# Patient Record
Sex: FEMALE | Race: WHITE | NOT HISPANIC OR LATINO | URBAN - METROPOLITAN AREA
[De-identification: names, ages, dates, MRNs, and addresses within clinical notes are randomized per-mention and may not be internally consistent; named-entity substitution may affect disease eponyms.]

---

## 2023-05-19 DIAGNOSIS — F41.8 ANXIOUS DEPRESSION: Primary | ICD-10-CM

## 2023-05-19 RX ORDER — ESCITALOPRAM OXALATE 20 MG/1
TABLET ORAL
Qty: 30 TABLET | Refills: 0 | Status: SHIPPED | OUTPATIENT
Start: 2023-05-19 | End: 2023-08-17

## 2023-08-17 DIAGNOSIS — F41.8 ANXIOUS DEPRESSION: ICD-10-CM

## 2023-08-17 RX ORDER — ESCITALOPRAM OXALATE 20 MG/1
TABLET ORAL
Qty: 30 TABLET | Refills: 0 | Status: SHIPPED | OUTPATIENT
Start: 2023-08-17 | End: 2023-09-28

## 2023-09-28 DIAGNOSIS — F41.8 ANXIOUS DEPRESSION: ICD-10-CM

## 2023-09-28 RX ORDER — ESCITALOPRAM OXALATE 20 MG/1
TABLET ORAL
Qty: 30 TABLET | Refills: 3 | Status: SHIPPED | OUTPATIENT
Start: 2023-09-28 | End: 2024-01-29

## 2023-10-02 PROBLEM — M25.831 MASS OF JOINT OF RIGHT WRIST: Status: ACTIVE | Noted: 2023-10-02

## 2023-10-02 PROBLEM — R53.83 MALAISE AND FATIGUE: Status: ACTIVE | Noted: 2023-10-02

## 2023-10-02 PROBLEM — R53.81 MALAISE AND FATIGUE: Status: ACTIVE | Noted: 2023-10-02

## 2023-10-02 PROBLEM — N97.9 FEMALE INFERTILITY: Status: ACTIVE | Noted: 2023-10-02

## 2023-10-02 PROBLEM — E55.9 VITAMIN D DEFICIENCY: Status: ACTIVE | Noted: 2023-10-02

## 2023-10-02 PROBLEM — N92.6 MENSTRUAL IRREGULARITY: Status: ACTIVE | Noted: 2023-10-02

## 2023-10-02 PROBLEM — F41.9 ANXIETY AND DEPRESSION: Status: ACTIVE | Noted: 2023-10-02

## 2023-10-02 PROBLEM — R79.89 ELEVATED PROLACTIN LEVEL: Status: ACTIVE | Noted: 2023-10-02

## 2023-10-02 PROBLEM — G56.03 BILATERAL CARPAL TUNNEL SYNDROME: Status: ACTIVE | Noted: 2023-10-02

## 2023-10-02 PROBLEM — E22.1 HYPERPROLACTINEMIA (MULTI): Status: ACTIVE | Noted: 2023-10-02

## 2023-10-02 PROBLEM — E78.00 ELEVATED LDL CHOLESTEROL LEVEL: Status: ACTIVE | Noted: 2023-10-02

## 2023-10-02 PROBLEM — E66.3 OVERWEIGHT (BMI 25.0-29.9): Status: ACTIVE | Noted: 2023-10-02

## 2023-10-02 PROBLEM — E23.7 PITUITARY LESION (MULTI): Status: ACTIVE | Noted: 2023-10-02

## 2023-10-02 PROBLEM — H65.03 BILATERAL ACUTE SEROUS OTITIS MEDIA: Status: ACTIVE | Noted: 2023-10-02

## 2023-10-02 PROBLEM — F32.A ANXIETY AND DEPRESSION: Status: ACTIVE | Noted: 2023-10-02

## 2023-10-02 PROBLEM — R82.998 LEUKOCYTES IN URINE: Status: ACTIVE | Noted: 2023-10-02

## 2023-10-02 RX ORDER — ALBUTEROL SULFATE 0.83 MG/ML
SOLUTION RESPIRATORY (INHALATION)
COMMUNITY
Start: 2019-04-10

## 2023-10-02 RX ORDER — CYCLOBENZAPRINE HCL 10 MG
TABLET ORAL
COMMUNITY
Start: 2022-11-01

## 2023-10-02 RX ORDER — ALBUTEROL SULFATE 90 UG/1
AEROSOL, METERED RESPIRATORY (INHALATION)
COMMUNITY

## 2023-10-04 ENCOUNTER — ANCILLARY PROCEDURE (OUTPATIENT)
Dept: RADIOLOGY | Facility: CLINIC | Age: 38
End: 2023-10-04
Payer: COMMERCIAL

## 2023-10-04 ENCOUNTER — OFFICE VISIT (OUTPATIENT)
Dept: ORTHOPEDIC SURGERY | Facility: CLINIC | Age: 38
End: 2023-10-04
Payer: COMMERCIAL

## 2023-10-04 DIAGNOSIS — M25.512 BILATERAL SHOULDER PAIN, UNSPECIFIED CHRONICITY: ICD-10-CM

## 2023-10-04 DIAGNOSIS — M25.511 BILATERAL SHOULDER PAIN, UNSPECIFIED CHRONICITY: ICD-10-CM

## 2023-10-04 DIAGNOSIS — M25.562 PAIN IN BOTH KNEES, UNSPECIFIED CHRONICITY: ICD-10-CM

## 2023-10-04 DIAGNOSIS — M25.561 PAIN IN BOTH KNEES, UNSPECIFIED CHRONICITY: ICD-10-CM

## 2023-10-04 DIAGNOSIS — M25.562 ACUTE PAIN OF BOTH KNEES: Primary | ICD-10-CM

## 2023-10-04 DIAGNOSIS — M25.511 ACUTE PAIN OF BOTH SHOULDERS: ICD-10-CM

## 2023-10-04 DIAGNOSIS — M25.561 ACUTE PAIN OF BOTH KNEES: Primary | ICD-10-CM

## 2023-10-04 DIAGNOSIS — M25.512 ACUTE PAIN OF BOTH SHOULDERS: ICD-10-CM

## 2023-10-04 PROCEDURE — 99203 OFFICE O/P NEW LOW 30 MIN: CPT | Performed by: ORTHOPAEDIC SURGERY

## 2023-10-04 PROCEDURE — 73564 X-RAY EXAM KNEE 4 OR MORE: CPT | Mod: 50

## 2023-10-04 PROCEDURE — 73564 X-RAY EXAM KNEE 4 OR MORE: CPT | Mod: BILATERAL PROCEDURE | Performed by: RADIOLOGY

## 2023-10-04 PROCEDURE — 73030 X-RAY EXAM OF SHOULDER: CPT | Mod: 50

## 2023-10-04 PROCEDURE — 73030 X-RAY EXAM OF SHOULDER: CPT | Mod: BILATERAL PROCEDURE | Performed by: RADIOLOGY

## 2023-10-04 PROCEDURE — 99213 OFFICE O/P EST LOW 20 MIN: CPT | Performed by: ORTHOPAEDIC SURGERY

## 2023-10-04 PROCEDURE — 1036F TOBACCO NON-USER: CPT | Performed by: ORTHOPAEDIC SURGERY

## 2023-10-04 RX ORDER — MELOXICAM 15 MG/1
15 TABLET ORAL DAILY
Qty: 30 TABLET | Refills: 0 | Status: SHIPPED | OUTPATIENT
Start: 2023-10-04 | End: 2023-10-30

## 2023-10-04 NOTE — PROGRESS NOTES
History of Present Illness:   The patient presents today endorsing bilateral knee pain. The pain localizes over the medial joint line.  The patient denies any recent trauma and notes the insidious onset of pain.  The pain is achy, constant, worse with activity and better with rest. The patient notes occasional locking, catching and giving out.  The patient has tried the following modalities: Rest, ice, anti-inflammatories, some physical therapy and home exercises.  Pain is worse on the right than the left.    Patient is also presented today for initial evaluation of bilateral shoulder pain that she has had for many years.  No obvious recent injury but did have an MRI in 2022 that had revealed some partial thickness tearing of the distal supraspinatus tendon.  She has been very active recently and has aggravated both shoulders.  She does feel an anterior pain in both shoulders that radiates laterally, no numbness, tingling, loss sensation in the bilateral upper extremity.  Pain is worse on the left than the right.    Currently run several businesses, is working as a , doing some remodeling, enjoys weightlifting and running, competes in triathlons.    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/2016    Pituitary adenoma    Personal history of other mental and behavioral disorders     History of depression    Unspecified asthma, uncomplicated 02/22/2019    Asthmatic bronchitis     Past Surgical History:   Procedure Laterality Date    OTHER SURGICAL HISTORY  10/20/2016    Ear Surgery    SINUS SURGERY  10/20/2016    Sinus Surgery       Current Outpatient Medications:     albuterol (ProAir HFA) 90 mcg/actuation inhaler, INHALE 1 TO 2 PUFFS EVERY 4 TO 6 HOURS AS NEEDED., Disp: , Rfl:     albuterol 2.5 mg /3 mL (0.083 %) nebulizer solution, USE 1 UNIT DOSE IN NEBULIZER 4 TIMES DAILY, Disp: , Rfl:     cyclobenzaprine (Flexeril) 10 mg tablet, TAKE ONE TABLET BY MOUTH  EVERY 8 HOURS AS NEEDED, Disp: , Rfl:     escitalopram (Lexapro) 20 mg tablet, TAKE ONE TABLET BY MOUTH DAILY, Disp: 30 tablet, Rfl: 3    Review of Systems   GENERAL: Negative for malaise, significant weight loss, fever  MUSCULOSKELETAL: See HPI  NEURO:  Negative for numbness / tingling     Physical Examination:  Bilateral Knee:  Skin healthy and intact  No gross swelling or ecchymosis  No significant varus or valgus malalignment  Effusion: absent    ROM:  Full flexion   Full extension  No pain with internal rotation of the hip  Tenderness to palpation:  medial joint line   No laxity to valgus stress  No laxity to varus stress  Negative Lachman´s test  Negative anterior drawer test  Negative posterior drawer test  Positive Amara´s test  Neurovascular exam normal distally    Bilateral Shoulder:  Skin healthy to gross inspection  No ecchymosis, no edema, no gross atrophy  No tenderness to palpation over acromioclavicular joint  No tenderness to palpation over biceps tendon  No tenderness to palpation over the cervical spine     ROM:  Full forward flexion  Full external rotation  IR symmetric to contralateral side  Strength:  5/5 Resisted elevation  5/5 Resisted external rotation    Negative lift off test   Negative Spurling´s test  Negative Neer and Hawking´s test  Mild pain with [place]'s test.  Negative Speed's test  Neurovascular exam normal distally      Imaging:  Plain films of bilateral knee reveal no acute fracture or dislocation, good alignment and position, and minimal signs of degenerative changes diffusely.  Plain films of the bilateral shoulder reveal no fracture or dislocation, good alignment and position, no signs of degenerative changes, no sign of acromial downsloping.  Appropriate humeral head height.    Previous MRI of the left shoulder reveals evidence of distal supraspinatus partial-thickness tearing without full-thickness component.  The superior labrum is difficult to assess due to image  quality.    Assessment:    Patient with bilateral knee pain concern for meniscal tear on the right, bilateral knee likely also representing patellofemoral syndrome.  Patient with bilateral shoulder pain, left worse than right, concern for likely SLAP tear in the setting of partial-thickness distal supraspinatus tearing on the left.  For the right shoulder, concern for SLAP tear.    Plan:  We discussed MRI of treatment option for the patient occluding rest, ice, anti-inflammatories as well as corticosteroid injections, physical therapy, bracing and home exercises.  Regarding bilateral knee pain, right worse than left, we do have some concern for medial meniscal pathology would recommend MRI for evaluation.  Recommend meloxicam prescription.  Regarding pain to the shoulders, left worse than right, reviewed that she may be trending towards small arthroscopic surgery for the left shoulder to assess and treat possible rotator cuff tear and SLAP pathology.  We also recommended corticosteroid injections to the left shoulder today, however patient declined.    Charlie Hendricks PA-C     In a face to face encounter, I evaluated the patient and performed a physical examination, discussed pertinent diagnostic studies if indicated and discussed diagnosis and management strategies with both the patient and physician assistant / nurse practitioner.  I reviewed the PA/NP's note and agree with the documented findings and plan of care.    Patient with bilateral shoulder pain consistent with superior labral pathology, bilateral medial knee pain concerning for medial meniscal pathology.  She is a triathlete, very active discussed formal therapy and anti-inflammatories, she feels improved recommend MRIs.    Walter Sorensen MD

## 2023-10-04 NOTE — LETTER
October 4, 2023     Chicho Padgett DO  [address] [facility] [address]    Patient: Vinita Mitchell   YOB: 1985   Date of Visit: 10/4/2023       Dear Dr. Chicho Padgett DO:    Thank you for referring Vinita Mitchell to me for evaluation. Below are my notes for this consultation.  If you have questions, please do not hesitate to call me. I look forward to following your patient along with you.       Sincerely,     Walter Sorensen MD      CC: No Recipients  ______________________________________________________________________________________      History of Present Illness:   The patient presents today endorsing bilateral knee pain. The pain localizes over the medial joint line.  The patient denies any recent trauma and notes the insidious onset of pain.  The pain is achy, constant, worse with activity and better with rest. The patient notes occasional locking, catching and giving out.  The patient has tried the following modalities: Rest, ice, anti-inflammatories, some physical therapy and home exercises.  Pain is worse on the right than the left.    Patient is also presented today for initial evaluation of bilateral shoulder pain that she has had for many years.  No obvious recent injury but did have an MRI in 2022 that had revealed some partial thickness tearing of the distal supraspinatus tendon.  She has been very active recently and has aggravated both shoulders.  She does feel an anterior pain in both shoulders that radiates laterally, no numbness, tingling, loss sensation in the bilateral upper extremity.  Pain is worse on the left than the right.    Currently run several businesses, is working as a , doing some remodeling, enjoys weightlifting and running, competes in triathlons.    Past Medical History:   Diagnosis Date   • Benign neoplasm of pituitary gland (CMS/[facility]) 09/21/2016    Pituitary adenoma   • Personal history of other mental and behavioral disorders      History of depression   • Unspecified asthma, uncomplicated 02/22/2019    Asthmatic bronchitis     Past Surgical History:   Procedure Laterality Date   • OTHER SURGICAL HISTORY  10/20/2016    Ear Surgery   • SINUS SURGERY  10/20/2016    Sinus Surgery       Current Outpatient Medications:   •  albuterol (ProAir HFA) 90 mcg/actuation inhaler, INHALE 1 TO 2 PUFFS EVERY 4 TO 6 HOURS AS NEEDED., Disp: , Rfl:   •  albuterol 2.5 mg /3 mL (0.083 %) nebulizer solution, USE 1 UNIT DOSE IN NEBULIZER 4 TIMES DAILY, Disp: , Rfl:   •  cyclobenzaprine (Flexeril) 10 mg tablet, TAKE ONE TABLET BY MOUTH EVERY 8 HOURS AS NEEDED, Disp: , Rfl:   •  escitalopram (Lexapro) 20 mg tablet, TAKE ONE TABLET BY MOUTH DAILY, Disp: 30 tablet, Rfl: 3    Review of Systems   GENERAL: Negative for malaise, significant weight loss, fever  MUSCULOSKELETAL: See HPI  NEURO:  Negative for numbness / tingling     Physical Examination:  Bilateral Knee:  Skin healthy and intact  No gross swelling or ecchymosis  No significant varus or valgus malalignment  Effusion: absent    ROM:  Full flexion   Full extension  No pain with internal rotation of the hip  Tenderness to palpation:  medial joint line   No laxity to valgus stress  No laxity to varus stress  Negative Lachman´s test  Negative anterior drawer test  Negative posterior drawer test  Positive Amara´s test  Neurovascular exam normal distally    Bilateral Shoulder:  Skin healthy to gross inspection  No ecchymosis, no edema, no gross atrophy  No tenderness to palpation over acromioclavicular joint  No tenderness to palpation over biceps tendon  No tenderness to palpation over the cervical spine     ROM:  Full forward flexion  Full external rotation  IR symmetric to contralateral side  Strength:  5/5 Resisted elevation  5/5 Resisted external rotation    Negative lift off test   Negative Spurling´s test  Negative Neer and Hawking´s test  Mild pain with [place]'s test.  Negative Speed's test  Neurovascular  exam normal distally      Imaging:  Plain films of bilateral knee reveal no acute fracture or dislocation, good alignment and position, and minimal signs of degenerative changes diffusely.  Plain films of the bilateral shoulder reveal no fracture or dislocation, good alignment and position, no signs of degenerative changes, no sign of acromial downsloping.  Appropriate humeral head height.    Previous MRI of the left shoulder reveals evidence of distal supraspinatus partial-thickness tearing without full-thickness component.  The superior labrum is difficult to assess due to image quality.    Assessment:    Patient with bilateral knee pain concern for meniscal tear on the right, bilateral knee likely also representing patellofemoral syndrome.  Patient with bilateral shoulder pain, left worse than right, concern for likely SLAP tear in the setting of partial-thickness distal supraspinatus tearing on the left.  For the right shoulder, concern for SLAP tear.    Plan:  We discussed MRI of treatment option for the patient occluding rest, ice, anti-inflammatories as well as corticosteroid injections, physical therapy, bracing and home exercises.  Regarding bilateral knee pain, right worse than left, we do have some concern for medial meniscal pathology would recommend MRI for evaluation.  Recommend meloxicam prescription.  Regarding pain to the shoulders, left worse than right, reviewed that she may be trending towards small arthroscopic surgery for the left shoulder to assess and treat possible rotator cuff tear and SLAP pathology.  We also recommended corticosteroid injections to the left shoulder today, however patient declined.    Charlie Hendricks PA-C     In a face to face encounter, I evaluated the patient and performed a physical examination, discussed pertinent diagnostic studies if indicated and discussed diagnosis and management strategies with both the patient and physician assistant / nurse practitioner.  I  reviewed the PA/NP's note and agree with the documented findings and plan of care.    Patient with bilateral shoulder pain consistent with superior labral pathology, bilateral medial knee pain concerning for medial meniscal pathology.  She is a triathlete, very active discussed formal therapy and anti-inflammatories, she feels improved recommend MRIs.    Walter Sorensen MD

## 2023-10-30 ENCOUNTER — OFFICE VISIT (OUTPATIENT)
Dept: ORTHOPEDIC SURGERY | Facility: CLINIC | Age: 38
End: 2023-10-30
Payer: COMMERCIAL

## 2023-10-30 DIAGNOSIS — G56.03 BILATERAL CARPAL TUNNEL SYNDROME: Primary | ICD-10-CM

## 2023-10-30 PROCEDURE — 1036F TOBACCO NON-USER: CPT | Performed by: ORTHOPAEDIC SURGERY

## 2023-10-30 PROCEDURE — 99214 OFFICE O/P EST MOD 30 MIN: CPT | Performed by: ORTHOPAEDIC SURGERY

## 2023-10-30 NOTE — PROGRESS NOTES
History present illness: Patient presents today for evaluation of the bilateral upper extremities.  She describes little in the way of relief from steroid injection to the right volar wrist mass and to bilateral carpal tunnels.  The wrist mass however is not discretely painful, or at least rarely so.      Past medical history: The patient's past medical history, family history, social history, and review of systems were documented on the patient medical intake.  The updated data was reviewed in the electronic medical record.  History is negative except otherwise stated in history of present illness.        Physical examination:  General: Alert and oriented to person, place, and time.  No acute distress and breathing comfortably: Pleasant and cooperative with examination.  HEENT: Head is normocephalic and atraumatic.  Neck: Supple, no visible swelling.  Cardiovascular: No palpable tachycardia  Lungs: No audible wheezing or labored breathing  Abdomen: Nondistended.  Extremities: Evaluation of the bilateral upper extremities finds the patient had palpable radial artery at the wrists with brisk capillary refill to all digits.  Patient has intact sensation to axillary radial median and ulnar nerves.  There are no open wounds.  There are no signs of infection.  There is no evidence of lymphedema or lymphatic streaking.  The patient has supple compartments to bilateral arm forearm and hand.  Volar radial distal third forearm mass consistent with ganglion.  Positive Tinel's over course of median nerve to bilateral wrist.      Radiology:      Assessment: Bilateral carpal tunnel syndrome.  Right wrist volar ganglion.      Plan: Treatment options were discussed.  Recommendations are made for EMG nerve conduction study test to evaluate for carpal tunnel syndrome bilaterally.  I will see back when the study is completed to discuss findings and treatment options from there.  No x-rays necessary upon  return            Procedure:

## 2024-01-05 DIAGNOSIS — M25.512 BILATERAL SHOULDER PAIN, UNSPECIFIED CHRONICITY: ICD-10-CM

## 2024-01-05 DIAGNOSIS — M25.562 PAIN IN BOTH KNEES, UNSPECIFIED CHRONICITY: ICD-10-CM

## 2024-01-05 DIAGNOSIS — M25.511 BILATERAL SHOULDER PAIN, UNSPECIFIED CHRONICITY: ICD-10-CM

## 2024-01-05 DIAGNOSIS — M25.561 PAIN IN BOTH KNEES, UNSPECIFIED CHRONICITY: ICD-10-CM

## 2024-01-22 ENCOUNTER — HOSPITAL ENCOUNTER (OUTPATIENT)
Dept: NEUROLOGY | Facility: HOSPITAL | Age: 39
Discharge: HOME | End: 2024-01-22
Payer: COMMERCIAL

## 2024-01-22 DIAGNOSIS — G56.03 BILATERAL CARPAL TUNNEL SYNDROME: ICD-10-CM

## 2024-01-22 PROCEDURE — 95913 NRV CNDJ TEST 13/> STUDIES: CPT | Performed by: PSYCHIATRY & NEUROLOGY

## 2024-01-22 PROCEDURE — 95885 MUSC TST DONE W/NERV TST LIM: CPT | Mod: GC,LT | Performed by: PSYCHIATRY & NEUROLOGY

## 2024-01-22 PROCEDURE — 95885 MUSC TST DONE W/NERV TST LIM: CPT | Performed by: PSYCHIATRY & NEUROLOGY

## 2024-01-22 PROCEDURE — 95886 MUSC TEST DONE W/N TEST COMP: CPT | Performed by: PSYCHIATRY & NEUROLOGY

## 2024-01-22 PROCEDURE — 95886 MUSC TEST DONE W/N TEST COMP: CPT | Mod: GC,RT | Performed by: PSYCHIATRY & NEUROLOGY

## 2024-01-29 DIAGNOSIS — F41.8 ANXIOUS DEPRESSION: ICD-10-CM

## 2024-01-29 RX ORDER — ESCITALOPRAM OXALATE 20 MG/1
TABLET ORAL
Qty: 30 TABLET | Refills: 3 | Status: SHIPPED | OUTPATIENT
Start: 2024-01-29 | End: 2024-05-31

## 2024-02-08 ENCOUNTER — OFFICE VISIT (OUTPATIENT)
Dept: ORTHOPEDIC SURGERY | Facility: CLINIC | Age: 39
End: 2024-02-08
Payer: COMMERCIAL

## 2024-02-08 DIAGNOSIS — G56.03 BILATERAL CARPAL TUNNEL SYNDROME: ICD-10-CM

## 2024-02-08 PROCEDURE — 1036F TOBACCO NON-USER: CPT | Performed by: ORTHOPAEDIC SURGERY

## 2024-02-08 PROCEDURE — 99214 OFFICE O/P EST MOD 30 MIN: CPT | Performed by: ORTHOPAEDIC SURGERY

## 2024-02-08 NOTE — PROGRESS NOTES
History present illness: Patient presents today for evaluation of symptoms compatible with bilateral carpal tunnel syndrome.  She presents for EMG nerve conduction study test.  She did not get much in the way of relief from previous carpal tunnel injection bilaterally.  Her EMG shows evidence for normal study.  Her symptoms are worsening.  She describes classic symptoms of numbness and tingling through median nerve distribution to bilateral hands.      Past medical history: The patient's past medical history, family history, social history, and review of systems were documented on the patient medical intake.  The updated data was reviewed in the electronic medical record.  History is negative except otherwise stated in history of present illness.        Physical examination:  General: Alert and oriented to person, place, and time.  No acute distress and breathing comfortably: Pleasant and cooperative with examination.  HEENT: Head is normocephalic and atraumatic.  Neck: Supple, no visible swelling.  Cardiovascular: No palpable tachycardia  Lungs: No audible wheezing or labored breathing  Abdomen: Nondistended.  Extremities: Evaluation of the bilateral upper extremities finds the patient had palpable radial artery at the wrists with brisk capillary refill to all digits.  Patient has intact sensation to axillary radial median and ulnar nerves.  There are no open wounds.  There are no signs of infection.  There is no evidence of lymphedema or lymphatic streaking.  The patient has supple compartments to bilateral arm forearm and hand.  Positive Tinel's over course of median nerve to bilateral wrist.  Median nerve is highly symptomatic on today's exam.      Radiology:      Assessment: Bilateral carpal tunnel syndrome      Plan: Subjective symptoms and physical examination are classic for carpal tunnel syndrome.  Her response to diagnostic steroid injection and the findings on EMG are not classic for carpal tunnel  syndrome.  We talked about a 6-week course of therapy for nerve gliding exercises for carpal tunnel syndrome and follow-up thereafter.  Despite nonoperative measures symptoms are worsening.  If she does not improve with nerve gliding and 6 additional weeks of observation we will likely recommend for right carpal tunnel release or left carpal tunnel release, the pending which side is worse.        Procedure:

## 2024-03-26 ENCOUNTER — APPOINTMENT (OUTPATIENT)
Dept: ORTHOPEDIC SURGERY | Facility: CLINIC | Age: 39
End: 2024-03-26
Payer: COMMERCIAL

## 2024-04-02 ENCOUNTER — OFFICE VISIT (OUTPATIENT)
Dept: ORTHOPEDIC SURGERY | Facility: CLINIC | Age: 39
End: 2024-04-02
Payer: COMMERCIAL

## 2024-04-02 DIAGNOSIS — M25.511 ACUTE PAIN OF BOTH SHOULDERS: ICD-10-CM

## 2024-04-02 DIAGNOSIS — M25.512 ACUTE PAIN OF BOTH SHOULDERS: ICD-10-CM

## 2024-04-02 DIAGNOSIS — S43.431A SUPERIOR LABRUM ANTERIOR-TO-POSTERIOR (SLAP) TEAR OF RIGHT SHOULDER: ICD-10-CM

## 2024-04-02 DIAGNOSIS — S76.019S: ICD-10-CM

## 2024-04-02 PROCEDURE — 99213 OFFICE O/P EST LOW 20 MIN: CPT | Performed by: ORTHOPAEDIC SURGERY

## 2024-04-02 PROCEDURE — 1036F TOBACCO NON-USER: CPT | Performed by: ORTHOPAEDIC SURGERY

## 2024-04-02 PROCEDURE — 99214 OFFICE O/P EST MOD 30 MIN: CPT | Performed by: ORTHOPAEDIC SURGERY

## 2024-04-02 NOTE — PROGRESS NOTES
History of Present Illness:   Patient returns today for bilateral shoulder pain, right worse than left as well as bilateral hip pain, right worse than left.  Regarding the shoulder she was evaluated previously for possible SLAP pathology, her pain is not improved and she still has the symptoms.  She notes that exercising makes her symptoms substantially worse and she has some occasional clicking and popping on the anterior aspect of the shoulder.  Denies any obvious weakness.  There is no numbness, tingling or loss of sensation.    Regarding bilateral hip pain, patient endorses history of multiple years of hip pain with burning stinging sensations across the lateral aspect of the hips just along the greater trochanter and abductor groups.  There is occasional groin pain as well that accompanies a pinching sensation in the groin.  She notes that she sits for long periods of time her hip pain becomes more substantial when she is more difficulty getting up out of a chair.  There is no obvious buttock pain or posterior thigh pain.    Review of Systems   GENERAL: Negative for malaise, significant weight loss, fever  MUSCULOSKELETAL: see HPI  NEURO:  Negative    Physical Examination:  Right Shoulder:    Skin healthy to gross inspection  No ecchymosis, no swelling, no gross atrophy  No tenderness to palpation over acromioclavicular joint  No tenderness to palpation over biceps tendon  No tenderness to palpation over the cervical spine     ROM:  Full forward flexion  Full external rotation  IR to thoracic spine, symmetric to contralateral side  Strength:  5-/5 Resisted elevation  5/5 Resisted external rotation  Negative lift off test   Negative Spurling´s test  Positive Neer and Hawking´s test  Positive Speeds's, [place]'s active compression test  Neurovascular exam normal distally     Bilateral Hip:  Normal gait  Negative Trendelenburg sign  Skin healthy and intact  No tenderness to palpation over lumbar spine  Mild  tenderness over greater trochanter    Full forward flexion  Symmetric motion, no loss of internal rotation   No weakness with resisted hip flexion, abduction or adduction    Mild positive negative flexion/adduction/internal rotation  Negative flexion/abduction/external rotation test  Negative straight leg raise  Neurovascular exam normal distally     Imaging:  Deferred    Assessment:   Patient with right shoulder pain concern for SLAP pathology.  Patient with bilateral hip pain concern for abductor tendon injury/chronic tearing interstitially versus lumbar radicular origin.  Concern for also for labral pathology bilaterally to the hips.    Plan:  We discussed a variety of treatment options for the patient occluding rest, ice, anti-inflammatories as well as MR arthrogram of the right shoulder as we do have strong suspicion for SLAP pathology.  Patient agreeable to this and would like to proceed.  Regarding treatment for bilateral hips would recommend a course of dedicated physical therapy for abductor tendons to formally evaluate and assess the degree of strength as well as any therapeutic regimens.  We have discussed possibility of dry needling for the hips if it is concern for musculoskeletal injury as this could promote healing.  Ultimately discussed that we would defer to hip specialist down the road if she fails to improve with therapy and anti-inflammatories.    Charlie Hendricks PA-C     In a face to face encounter, I evaluated the patient and performed a physical examination, discussed pertinent diagnostic studies if indicated and discussed diagnosis and management strategies with both the patient and physician assistant / nurse practitioner.  I reviewed the PA/NP's note and agree with the documented findings and plan of care.    Patient with bilateral shoulder pain right greater than left concern for impingement versus superior labral pathology.  Recommend MRI to evaluate.  She does have pain in multiple  joints discussed the possibility of a chronic diffuse tendinopathy.    Walter Sorensen MD

## 2024-04-15 ENCOUNTER — TELEPHONE (OUTPATIENT)
Dept: PRIMARY CARE | Facility: CLINIC | Age: 39
End: 2024-04-15
Payer: COMMERCIAL

## 2024-04-15 NOTE — TELEPHONE ENCOUNTER
Patient lvm today that she would like an order for mammogram and ultrasound for lump on right breast.   Can you place this for her?

## 2024-04-17 DIAGNOSIS — Z12.31 ENCOUNTER FOR SCREENING MAMMOGRAM FOR MALIGNANT NEOPLASM OF BREAST: Primary | ICD-10-CM

## 2024-04-29 DIAGNOSIS — Z12.31 SCREENING MAMMOGRAM, ENCOUNTER FOR: ICD-10-CM

## 2024-04-29 DIAGNOSIS — N63.0 BREAST LUMP IN FEMALE: ICD-10-CM

## 2024-04-30 ENCOUNTER — HOSPITAL ENCOUNTER (OUTPATIENT)
Dept: RADIOLOGY | Facility: HOSPITAL | Age: 39
Discharge: HOME | End: 2024-04-30
Payer: COMMERCIAL

## 2024-04-30 DIAGNOSIS — M25.512 ACUTE PAIN OF BOTH SHOULDERS: ICD-10-CM

## 2024-04-30 DIAGNOSIS — S43.431A SUPERIOR LABRUM ANTERIOR-TO-POSTERIOR (SLAP) TEAR OF RIGHT SHOULDER: ICD-10-CM

## 2024-04-30 DIAGNOSIS — M25.511 ACUTE PAIN OF BOTH SHOULDERS: ICD-10-CM

## 2024-04-30 PROCEDURE — 77002 NEEDLE LOCALIZATION BY XRAY: CPT | Mod: RT

## 2024-04-30 PROCEDURE — 73222 MRI JOINT UPR EXTREM W/DYE: CPT | Mod: RT

## 2024-04-30 PROCEDURE — 2550000001 HC RX 255 CONTRASTS: Performed by: ORTHOPAEDIC SURGERY

## 2024-04-30 PROCEDURE — A9575 INJ GADOTERATE MEGLUMI 0.1ML: HCPCS | Performed by: ORTHOPAEDIC SURGERY

## 2024-04-30 PROCEDURE — 73222 MRI JOINT UPR EXTREM W/DYE: CPT | Mod: RIGHT SIDE | Performed by: RADIOLOGY

## 2024-04-30 PROCEDURE — 96373 THER/PROPH/DIAG INJ IA: CPT | Performed by: ORTHOPAEDIC SURGERY

## 2024-04-30 RX ORDER — LIDOCAINE HYDROCHLORIDE 10 MG/ML
10 INJECTION, SOLUTION EPIDURAL; INFILTRATION; INTRACAUDAL; PERINEURAL ONCE
Status: DISCONTINUED | OUTPATIENT
Start: 2024-04-30 | End: 2024-05-01 | Stop reason: HOSPADM

## 2024-04-30 RX ORDER — GADOTERATE MEGLUMINE 376.9 MG/ML
1 INJECTION INTRAVENOUS
Status: COMPLETED | OUTPATIENT
Start: 2024-04-30 | End: 2024-04-30

## 2024-04-30 RX ORDER — LIDOCAINE HYDROCHLORIDE 10 MG/ML
8 INJECTION, SOLUTION EPIDURAL; INFILTRATION; INTRACAUDAL; PERINEURAL ONCE
Status: DISCONTINUED | OUTPATIENT
Start: 2024-04-30 | End: 2024-05-01 | Stop reason: HOSPADM

## 2024-04-30 RX ADMIN — GADOTERATE MEGLUMINE 1 ML: 376.9 INJECTION INTRAVENOUS at 10:24

## 2024-04-30 RX ADMIN — IOHEXOL 8 ML: 180 INJECTION INTRAVENOUS at 11:06

## 2024-05-07 ENCOUNTER — OFFICE VISIT (OUTPATIENT)
Dept: ORTHOPEDIC SURGERY | Facility: CLINIC | Age: 39
End: 2024-05-07
Payer: COMMERCIAL

## 2024-05-07 DIAGNOSIS — M25.512 BILATERAL SHOULDER PAIN, UNSPECIFIED CHRONICITY: ICD-10-CM

## 2024-05-07 DIAGNOSIS — S43.431A SUPERIOR LABRUM ANTERIOR-TO-POSTERIOR (SLAP) TEAR OF RIGHT SHOULDER: ICD-10-CM

## 2024-05-07 DIAGNOSIS — M25.511 BILATERAL SHOULDER PAIN, UNSPECIFIED CHRONICITY: ICD-10-CM

## 2024-05-07 PROCEDURE — 1036F TOBACCO NON-USER: CPT | Performed by: ORTHOPAEDIC SURGERY

## 2024-05-07 PROCEDURE — 99213 OFFICE O/P EST LOW 20 MIN: CPT | Performed by: ORTHOPAEDIC SURGERY

## 2024-05-07 PROCEDURE — 99214 OFFICE O/P EST MOD 30 MIN: CPT | Performed by: ORTHOPAEDIC SURGERY

## 2024-05-07 NOTE — PROGRESS NOTES
History of Present Illness:   Patient returns today with  right greater than left  shoulder pain.  The patient is here to review MRI based on failure to improve with non-surgical modalities.    The pain is sharp in nature, localizes lateral and deep.  Better with rest.    Review of Systems   GENERAL: Negative for malaise, significant weight loss, fever  MUSCULOSKELETAL: see HPI  NEURO:  Negative    Physical Examination:  Right Shoulder:    Skin healthy to gross inspection  No ecchymosis, no swelling, no gross atrophy  No tenderness to palpation over acromioclavicular joint  No tenderness to palpation over biceps tendon  No tenderness to palpation over the cervical spine     ROM:  Full forward flexion  Full external rotation  IR to thoracic spine, symmetric to contralateral side  Strength:  5-/5 Resisted elevation  5/5 Resisted external rotation  Negative lift off test   Negative Spurling´s test  Positive Neer and Hawking´s test  Neurovascular exam normal distally      Imaging:  MRI demonstrates: Anterior superior labral tear right shoulder    Assessment:  Patient with right shoulder SLAP tear with some anterior extension    Plan:  MRI was reviewed.   We reviewed with the patient a variety of treatment options.  She states the Mobic made her feel slightly weird and we had to discontinue that.    Discussed formal physical therapy she is working with a  in order was provided.    Discussed arthroscopy labral repair possible biceps tenodesis.  She like to delay that if possible due to the weather, she has similar symptoms on the contralateral side could consider imaging but feel she likely has a similar issue.

## 2024-05-08 ENCOUNTER — APPOINTMENT (OUTPATIENT)
Dept: ORTHOPEDIC SURGERY | Facility: CLINIC | Age: 39
End: 2024-05-08
Payer: COMMERCIAL

## 2024-05-08 ENCOUNTER — APPOINTMENT (OUTPATIENT)
Dept: RADIOLOGY | Facility: HOSPITAL | Age: 39
End: 2024-05-08
Payer: COMMERCIAL

## 2024-05-13 ENCOUNTER — HOSPITAL ENCOUNTER (OUTPATIENT)
Dept: RADIOLOGY | Facility: CLINIC | Age: 39
Discharge: HOME | End: 2024-05-13
Payer: COMMERCIAL

## 2024-05-13 VITALS — BODY MASS INDEX: 25.2 KG/M2 | HEIGHT: 59 IN | WEIGHT: 125 LBS

## 2024-05-13 DIAGNOSIS — Z12.31 ENCOUNTER FOR SCREENING MAMMOGRAM FOR MALIGNANT NEOPLASM OF BREAST: ICD-10-CM

## 2024-05-13 DIAGNOSIS — N63.0 BREAST LUMP IN FEMALE: ICD-10-CM

## 2024-05-13 PROCEDURE — 76983 USE EA ADDL TARGET LESION: CPT

## 2024-05-13 PROCEDURE — 77062 BREAST TOMOSYNTHESIS BI: CPT

## 2024-05-13 PROCEDURE — 76642 ULTRASOUND BREAST LIMITED: CPT | Mod: 50,59

## 2024-05-13 PROCEDURE — 77066 DX MAMMO INCL CAD BI: CPT | Performed by: STUDENT IN AN ORGANIZED HEALTH CARE EDUCATION/TRAINING PROGRAM

## 2024-05-13 PROCEDURE — 76642 ULTRASOUND BREAST LIMITED: CPT | Performed by: STUDENT IN AN ORGANIZED HEALTH CARE EDUCATION/TRAINING PROGRAM

## 2024-05-13 PROCEDURE — 76982 USE 1ST TARGET LESION: CPT | Mod: 50

## 2024-05-13 PROCEDURE — 77062 BREAST TOMOSYNTHESIS BI: CPT | Performed by: STUDENT IN AN ORGANIZED HEALTH CARE EDUCATION/TRAINING PROGRAM

## 2024-05-31 DIAGNOSIS — F41.8 ANXIOUS DEPRESSION: ICD-10-CM

## 2024-05-31 RX ORDER — ESCITALOPRAM OXALATE 20 MG/1
TABLET ORAL
Qty: 30 TABLET | Refills: 2 | Status: SHIPPED | OUTPATIENT
Start: 2024-05-31

## 2024-11-08 DIAGNOSIS — F41.8 ANXIOUS DEPRESSION: ICD-10-CM

## 2024-11-08 RX ORDER — ESCITALOPRAM OXALATE 20 MG/1
20 TABLET ORAL DAILY
Qty: 30 TABLET | Refills: 3 | Status: SHIPPED | OUTPATIENT
Start: 2024-11-08

## 2024-12-13 ENCOUNTER — APPOINTMENT (OUTPATIENT)
Dept: ORTHOPEDIC SURGERY | Facility: CLINIC | Age: 39
End: 2024-12-13
Payer: COMMERCIAL

## 2024-12-13 ENCOUNTER — HOSPITAL ENCOUNTER (OUTPATIENT)
Dept: RADIOLOGY | Facility: CLINIC | Age: 39
Discharge: HOME | End: 2024-12-13
Payer: COMMERCIAL

## 2024-12-13 DIAGNOSIS — M25.559 HIP PAIN, UNSPECIFIED LATERALITY: ICD-10-CM

## 2024-12-13 DIAGNOSIS — S43.432A LABRAL TEAR OF SHOULDER, LEFT, INITIAL ENCOUNTER: ICD-10-CM

## 2024-12-13 PROCEDURE — 72170 X-RAY EXAM OF PELVIS: CPT

## 2024-12-13 RX ORDER — CYCLOBENZAPRINE HCL 10 MG
10 TABLET ORAL NIGHTLY PRN
Qty: 14 TABLET | Refills: 0 | Status: SHIPPED | OUTPATIENT
Start: 2024-12-13 | End: 2024-12-27

## 2024-12-13 NOTE — PROGRESS NOTES
History of Present Illness:  Patient presents today endorsing bilateral shoulder pain.  The pain is worse with overhead activity and tends to wake the patient at night.  The patient denies a traumatic injury.    The pain is sharp in nature, localizes lateral and deep.  Better with rest.    She has a known SLAP tear on the right.  She has been having mechanical symptoms despite PT, NSAIDs etc      She is also endorsing bilateral groin pain.    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    Pituitary adenoma    Personal history of other mental and behavioral disorders     History of depression    Unspecified asthma, uncomplicated ([facility]) 02/22/2019    Asthmatic bronchitis     Past Surgical History:   Procedure Laterality Date    OTHER SURGICAL HISTORY  10/20/2016    Ear Surgery    SINUS SURGERY  10/20/2016    Sinus Surgery       Current Outpatient Medications:     albuterol (ProAir HFA) 90 mcg/actuation inhaler, INHALE 1 TO 2 PUFFS EVERY 4 TO 6 HOURS AS NEEDED., Disp: , Rfl:     albuterol 2.5 mg /3 mL (0.083 %) nebulizer solution, USE 1 UNIT DOSE IN NEBULIZER 4 TIMES DAILY, Disp: , Rfl:     cyclobenzaprine (Flexeril) 10 mg tablet, TAKE ONE TABLET BY MOUTH EVERY 8 HOURS AS NEEDED, Disp: , Rfl:     cyclobenzaprine (Flexeril) 10 mg tablet, Take 1 tablet (10 mg) by mouth as needed at bedtime for muscle spasms for up to 14 days., Disp: 14 tablet, Rfl: 0    escitalopram (Lexapro) 20 mg tablet, TAKE ONE TABLET BY MOUTH EVERY DAY, Disp: 30 tablet, Rfl: 3    meloxicam (Mobic) 15 mg tablet, TAKE ONE TABLET (15 MG) BY MOUTH ONCE DAILY, Disp: 30 tablet, Rfl: 0    Review of Systems   GENERAL: Negative for malaise, significant weight loss, fever  MUSCULOSKELETAL: see HPI  NEURO:  Negative    Physical Examination:  Bilateral Shoulder:    Skin healthy to gross inspection  No ecchymosis, no swelling, no gross atrophy  No tenderness to palpation over acromioclavicular joint  No  tenderness to palpation over biceps tendon  No tenderness to palpation over the cervical spine     Range of motion:  Full forward flexion  Full external rotation  IR to thoracic spine, symmetric to contralateral side  Strength:  Intact    + son's  /speeds  Negative lift off test   Negative Spurling´s test  Positive Neer and Hawkin´s test  Neurovascular exam normal distally    Bilateral Hip:  Normal gait  Negative Trendelenburg sign  Skin healthy and intact  No tenderness to palpation over lumbar spine  No tenderness over greater trochanter    Full forward flexion  Symmetric motion, no loss of internal rotation   No weakness with resisted hip flexion, abduction or adduction    Positive flexion/adduction/internal rotation    Imaging:  Shoulders: Radiographs demonstrate healthy joint spaces with no evidence of significant degenerative changes or fractures    Ap pelvis:  mild cam deformity, no DJD    Assessment:  Patient with bilateral shoulder pain right SLAP tear concern for Left Slap tear left    Plan:  We discussed with the patient we are concerned about a left SLAP tear as well as she is having significant mechanical symptoms concerned about a bucket-handle type tear.  We reviewed MR arthrogram for that shoulder as well as some continued oral medications and her home exercise program.  She has done extensive therapy and is active in the weight room.    Regarding her hips possibly some early femoral acetabular impingement discussed home exercise program.  If she fails to improve consider MR arthrogram and referral to hip specialist.

## 2025-02-06 ENCOUNTER — HOSPITAL ENCOUNTER (OUTPATIENT)
Dept: RADIOLOGY | Facility: HOSPITAL | Age: 40
Discharge: HOME | End: 2025-02-06
Payer: COMMERCIAL

## 2025-02-06 DIAGNOSIS — S43.432A LABRAL TEAR OF SHOULDER, LEFT, INITIAL ENCOUNTER: ICD-10-CM

## 2025-02-06 PROCEDURE — 23350 INJECTION FOR SHOULDER X-RAY: CPT | Mod: LT

## 2025-02-06 PROCEDURE — A9575 INJ GADOTERATE MEGLUMI 0.1ML: HCPCS | Performed by: ORTHOPAEDIC SURGERY

## 2025-02-06 PROCEDURE — 2550000001 HC RX 255 CONTRASTS: Performed by: ORTHOPAEDIC SURGERY

## 2025-02-06 RX ORDER — LIDOCAINE HYDROCHLORIDE 10 MG/ML
5 INJECTION, SOLUTION INFILTRATION; PERINEURAL ONCE
Status: DISCONTINUED | OUTPATIENT
Start: 2025-02-06 | End: 2025-02-07 | Stop reason: HOSPADM

## 2025-02-06 RX ORDER — LIDOCAINE HYDROCHLORIDE 10 MG/ML
INJECTION, SOLUTION INFILTRATION; PERINEURAL
Status: DISPENSED
Start: 2025-02-06 | End: 2025-02-06

## 2025-02-06 RX ORDER — GADOTERATE MEGLUMINE 376.9 MG/ML
0.1 INJECTION INTRAVENOUS
Status: COMPLETED | OUTPATIENT
Start: 2025-02-06 | End: 2025-02-06

## 2025-02-06 RX ADMIN — IOHEXOL 5 ML: 240 INJECTION, SOLUTION INTRATHECAL; INTRAVASCULAR; INTRAVENOUS; ORAL at 11:51

## 2025-02-06 RX ADMIN — GADOTERATE MEGLUMINE 0.1 ML: 376.9 INJECTION INTRAVENOUS at 11:51

## 2025-02-06 NOTE — PRE-PROCEDURE NOTE
Interventional Radiology Preprocedure Note    Indication for procedure: The encounter diagnosis was Labral tear of shoulder, left, initial encounter.    Relevant review of systems: left shoulder pain    Lab Results   Component Value Date    CREATININE 1.02 08/04/2021       Planned Sedation/Anesthesia: local    Airway assessment: normal    Directed physical examination:    Alert and oriented, left shoulder pain    Benefits, risks and alternatives of procedure and planned sedation have been discussed with the patient and/or their representative. All questions answered and they agree to proceed.

## 2025-02-06 NOTE — POST-PROCEDURE NOTE
Interventional Radiology Brief Postprocedure Note    Attending: Jean Parker CNP    Assistant: None    Diagnosis: left shoulder pain    Description of procedure:  The patient was placed in a supine position on the fluoroscopic table and was prepped and draped in usual sterile fashion. The left shoulder joint was localized under fluoroscopy. Lidocaine was used for local anesthesia. Under fluoroscopic guidance a 20 gauge needle was inserted into the left shoulder joint. Approximately 10 mL of solution containing lidocaine, Omnipaque 240 iodinated contrast and Multihance gadolinium contrast was injected into the left shoulder joint.         Anesthesia:  Local    Complications: None    Estimated Blood Loss: none    Medications (Filter: Administrations occurring from 1200 to 1200 on 02/06/25) As of 02/06/25 1200      None          No specimens collected      See detailed result report with images in PACS.    The patient tolerated the procedure well without incident or complication and is in stable condition.

## 2025-02-07 ENCOUNTER — HOSPITAL ENCOUNTER (OUTPATIENT)
Dept: RADIOLOGY | Facility: CLINIC | Age: 40
Discharge: HOME | End: 2025-02-07
Payer: COMMERCIAL

## 2025-02-07 ENCOUNTER — OFFICE VISIT (OUTPATIENT)
Dept: ORTHOPEDIC SURGERY | Facility: CLINIC | Age: 40
End: 2025-02-07
Payer: COMMERCIAL

## 2025-02-07 VITALS — BODY MASS INDEX: 26.21 KG/M2 | WEIGHT: 130 LBS | HEIGHT: 59 IN

## 2025-02-07 DIAGNOSIS — S39.012A LOW BACK STRAIN, INITIAL ENCOUNTER: Primary | ICD-10-CM

## 2025-02-07 DIAGNOSIS — M54.50 ACUTE LOW BACK PAIN, UNSPECIFIED BACK PAIN LATERALITY, UNSPECIFIED WHETHER SCIATICA PRESENT: ICD-10-CM

## 2025-02-07 PROCEDURE — 3008F BODY MASS INDEX DOCD: CPT | Performed by: FAMILY MEDICINE

## 2025-02-07 PROCEDURE — 99204 OFFICE O/P NEW MOD 45 MIN: CPT | Performed by: FAMILY MEDICINE

## 2025-02-07 PROCEDURE — 1036F TOBACCO NON-USER: CPT | Performed by: FAMILY MEDICINE

## 2025-02-07 PROCEDURE — 72100 X-RAY EXAM L-S SPINE 2/3 VWS: CPT

## 2025-02-07 RX ORDER — LORAZEPAM 0.5 MG/1
TABLET ORAL
COMMUNITY

## 2025-02-07 RX ORDER — MELOXICAM 15 MG/1
15 TABLET ORAL DAILY
Qty: 30 TABLET | Refills: 0 | Status: SHIPPED | OUTPATIENT
Start: 2025-02-07

## 2025-02-07 NOTE — PROGRESS NOTES
History of Present Illness   Chief Complaint   Patient presents with    OTHER     LOW BACK PAIN FOR 3 DAYS  PATIENT WAS AT THE GYM AND DID A SQUAT AND FELT SOMETHING POP       The patient is 39 y.o. female  here with a complaint of low back pain with radiation of pain down her left upper leg with some numbness and tingling in her left leg.  Onset of symptoms 3 days ago, says she was doing some wall ball toss is at the gym, she was catching a ball and squatting she felt a painful popping sensation across her low back, she has had ongoing pain since that time, she admits to some feelings of tightness/spasming in her back, pain is worse with sitting but is present at all times.  She says laying on her back with her knees flexed are of benefit.  Patient does have a history of prior back issues, says she had a similar low back strain around 9 months ago but seem to improve over the course of a few days with conservative treatment.  She does see a chiropractor for intermittent adjustments in [place], she has not seen since her acute symptoms started a few days ago.  She does have history of prior pars defect in high school that was treated conservatively.  She denies any bowel or bladder dysfunction, no saddle anesthesia, no lower extremity numbness or weakness.  Patient has been taking some Flexeril that she has for a right shoulder injury with minimal change, she has been using over-the-counter Motrin as well.    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 Sig Documenting Provider Last Dose Status   albuterol (ProAir HFA) 90  mcg/actuation inhaler [id number]  INHALE 1 TO 2 PUFFS EVERY 4 TO 6 HOURS AS NEEDED. Historical Provider, MD  Active   albuterol 2.5 mg /3 mL (0.083 %) nebulizer solution [id number] Yes USE 1 UNIT DOSE IN NEBULIZER 4 TIMES DAILY Historical Provider, MD  Active   cyclobenzaprine (Flexeril) 10 mg tablet [id number]  TAKE ONE TABLET BY MOUTH EVERY 8 HOURS AS NEEDED Historical Provider, MD  Active   cyclobenzaprine (Flexeril) 10 mg tablet [id number]  Take 1 tablet (10 mg) by mouth as needed at bedtime for muscle spasms for up to 14 days. Walter Sorensen MD   24 [id number]   escitalopram (Lexapro) 20 mg tablet [id number] Yes TAKE ONE TABLET BY MOUTH EVERY DAY Chicho Padgett DO  Active   LORazepam (Ativan) 0.5 mg tablet [id number] Yes Take by mouth. Historical Provider, MD  Active   meloxicam (Mobic) 15 mg tablet [id number]  TAKE ONE TABLET (15 MG) BY MOUTH ONCE DAILY Walter Sorensen MD  Active                    Allergies   Allergen Reactions    Sulfa (Sulfonamide Antibiotics) Unknown    Sulfone Unknown    Sulfonylureas Unknown       Social History     Socioeconomic History    Marital status: Single     Spouse name: Not on file    Number of children: Not on file    Years of education: Not on file    Highest education level: Not on file   Occupational History    Not on file   Tobacco Use    Smoking status: Never    Smokeless tobacco: Never   Substance and Sexual Activity    Alcohol use: Not on file    Drug use: Not on file    Sexual activity: Not on file   Other Topics Concern    Not on file   Social History Narrative    Not on file     Social Drivers of Health     Financial Resource Strain: Not on file   Food Insecurity: Not on file   Transportation Needs: Not on file   Physical Activity: Not on file   Stress: Not on file   Social Connections: Not on file   Intimate Partner Violence: Not on file   Housing Stability: Not on file       Past Surgical History:   Procedure Laterality Date    OTHER SURGICAL HISTORY  10/20/2016     Ear Surgery    SINUS SURGERY  10/20/2016    Sinus Surgery          Review of Systems   GENERAL: Negative  GI: Negative  MUSCULOSKELETAL: See HPI  SKIN: Negative  NEURO:  Negative     Physical Exam:    General/Constitutional: well appearing, no distress, appears stated age  HEENT: sclera clear  Respiratory: non labored breathing  Vascular: No edema, swelling or tenderness, except as noted in detailed exam.  Integumentary: No impressive skin lesions present, except as noted in detailed exam.  Neurological:  Alert and oriented   Psychological:  Normal mood and affect.  Musculoskeletal: Normal, except as noted in detailed exam and in HPI    Lumbar spine: Normal appearance. there is some midline tenderness as well as some bilateral paraspinal muscle tenderness with associated soft tissue texture changes.  No SI joint tenderness bilaterally. She has limited range of motion, flexion 45 degrees, extension to neutral both with exacerbation of pain.  There is some slight limited mobility with bilateral twisting and sidebending with exacerbation of low back pain.  Straight leg raise test and sometimes bilaterally exacerbate low back pain, no worsening radicular symptoms on the left with this.  No pathologic lower extremity reflexes are present.  Negative clonus bilaterally.  Sensation intact to light touch throughout bilateral lower extremity. No lower extremity motor deficits.  Good range of motion at bilateral hips, Charlie maneuver bilaterally exacerbates low back pain.       Imaging: X-rays of lumbar spine obtained today and independently reviewed, no acute abnormalities are seen, chronic pars defect at L4-L5 is seen, there is some mild, grade 1 retrolisthesis of L5 on L4.  There is some mild disc height loss at L5-S1.      Assessment   1. Low back strain, initial encounter        2. Acute low back pain, unspecified back pain laterality, unspecified whether sciatica present  XR lumbar spine 2-3 views    meloxicam (Mobic)  15 mg tablet            Plan: Symptoms consistent with acute low back strain with some radiculopathy in the left lower extremity, there is no red flag signs or symptoms.  History of some chronic back issues in the past.  Recommending conservative managements, she was given a prescription for meloxicam to assist in pain control, she will continue with Flexeril.  She does have a physical therapist and chiropractor that she can see for some stretching/manipulation as symptoms improve.  We discussed that if symptoms are ongoing despite conservative management she may benefit from possible MRI at some point.  She will refrain from workouts in the gym for the time being.  She will follow-up as symptoms dictate.

## 2025-03-22 DIAGNOSIS — F41.8 ANXIOUS DEPRESSION: ICD-10-CM

## 2025-03-26 RX ORDER — ESCITALOPRAM OXALATE 20 MG/1
20 TABLET ORAL DAILY
Qty: 30 TABLET | Refills: 3 | Status: SHIPPED | OUTPATIENT
Start: 2025-03-26